# Patient Record
Sex: FEMALE | Race: WHITE
[De-identification: names, ages, dates, MRNs, and addresses within clinical notes are randomized per-mention and may not be internally consistent; named-entity substitution may affect disease eponyms.]

---

## 2020-09-29 ENCOUNTER — HOSPITAL ENCOUNTER (EMERGENCY)
Dept: HOSPITAL 63 - ER | Age: 35
Discharge: HOME | End: 2020-09-29
Payer: COMMERCIAL

## 2020-09-29 VITALS — DIASTOLIC BLOOD PRESSURE: 68 MMHG | SYSTOLIC BLOOD PRESSURE: 104 MMHG

## 2020-09-29 VITALS — WEIGHT: 187.61 LBS | HEIGHT: 69 IN | BODY MASS INDEX: 27.79 KG/M2
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
## 2020-09-29 DIAGNOSIS — F17.210: ICD-10-CM

## 2020-09-29 DIAGNOSIS — M79.645: Primary | ICD-10-CM

## 2020-09-29 DIAGNOSIS — K21.9: ICD-10-CM
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
## 2020-09-29 PROCEDURE — 99283 EMERGENCY DEPT VISIT LOW MDM: CPT

## 2020-09-29 PROCEDURE — 73130 X-RAY EXAM OF HAND: CPT

## 2020-09-29 NOTE — PHYS DOC
Past History


Past Medical History:  Anxiety, Depression, GERD


Past Surgical History:  Cholecystectomy, , Hysterectomy, Tonsillectomy


Additional Smoking Information:  


1-2/DAY


Alcohol Use:  None


Drug Use:  None





General Adult


EDM:


Chief Complaint:  HAND PROBLEM





HPI:


HPI:


34-year-old female past medical history significant for GERD and anxiety, 

presents the ED with complaints of localized left thumb pain over the MCP joint 

and metacarpal region for the past 2 months after patient had an accidental fall

with FOOSH injury.  Patient is left-hand dominant.  Was seen by her primary care

physician and referred to a hand specialist but appointment is in November.  

States pain is a dull nagging discomfort.  Has been using an immobilizer and 

over-the-counter analgesia but is very busy at home with 5 kids.  Denies any dec

reased range of motion, lack of sensation, severe pain or skin color changes. Pt

reports decreased left thumb  strength. No specific position worsens the 

pain. Not a  or avid skier.





Review of Systems:


Review of Systems:


Constitutional:  Denies fever or chills 


Eyes:  Denies change in visual acuity 


HENT:  Denies nasal congestion or sore throat 


Respiratory:  Denies cough or shortness of breath or hemoptysis


Cardiovascular:  Denies chest pain or edema 


GI:  Denies abdominal pain, nausea, vomiting,  or diarrhea 


: Denies dysuria 


Musculoskeletal:  Denies back pain or swelling or elbow/shoulder/other joint 

pain


Integument:  Denies rash 


Neurologic:  Denies headache, focal weakness or sensory changes, 


Endocrine:  Denies polyuria or polydipsia 


Lymphatic:  Denies swollen glands 


Psychiatric:  Denies depression or anxiety





Heart Score:


Risk Factors:


Risk Factors:  DM, Current or recent (<one month) smoker, HTN, HLP, family 

history of CAD, obesity.


Risk Scores:


Score 0 - 3:  2.5% MACE over next 6 weeks - Discharge Home


Score 4 - 6:  20.3% MACE over next 6 weeks - Admit for Clinical Observation


Score 7 - 10:  72.7% MACE over next 6 weeks - Early Invasive Strategies





Allergies:


Allergies:





Allergies








Coded Allergies Type Severity Reaction Last Updated Verified


 


  No Known Drug Allergies    3/24/17 No











Physical Exam:


PE:





Constitutional: Well developed, well nourished, no acute distress, non-toxic 

appearance. []


HENT: Normocephalic, atraumatic, 


Eyes:  EOMI, conjunctiva normal, no discharge. [] 


Neck: Normal range of motion, supple, 


Cardiovascular: S1, S2 present


Lungs & Thorax: Speaking in full sentences, bilateral equal chest rise


Abdomen:  soft, no tenderness, 


Skin: Warm, dry, no erythema, no rash. [] 


Back: No tenderness, 


Extremities:  no cyanosis, no clubbing, ROM intact, no edema, no joint swelling,

 localized ttp over mcp joint, positive finkelsteins test with pain over APL/EPB

 tendon


Neurologic: Alert and oriented X 3, normal motor function, normal sensory 

function, no focal deficits noted. []


Psychologic: Affect normal, judgement normal, mood normal. []





Current Patient Data:


Vital Signs:





                                   Vital Signs








  Date Time  Temp Pulse Resp B/P (MAP) Pulse Ox O2 Delivery O2 Flow Rate FiO2


 


20 13:20 97.7 75 20 104/68 (80) 98 Room Air  











EKG:


EKG:


[]





Radiology/Procedures:


Radiology/Procedures:


                                 IMAGING REPORT





                                     Signed





PATIENT: RUBEN JIMÉNEZ  ACCOUNT: [id number]     MRN#: [id number]


: 1985           LOCATION: ER              AGE: 34


SEX: F                    EXAM DT: 20         ACCESSION#: [id number]


STATUS: REG ER            ORD. PHYSICIAN: LOW LARSEN DO


REASON: left thumb pain


PROCEDURE: HAND LEFT 3V





PROCEDURE: HAND LEFT 3V


 


STUDY DATE: 2020


 


CLINICAL INDICATION / HISTORY: Reason: left thumb pain / Spl. 


Instructions:  / History: .


 


TECHNIQUE: PA, lateral and oblique views of the left hand.


 


COMPARISON: None


 


FINDINGS: No fracture or dislocation is identified. The bone density is 


normal. The joint spaces are maintained, and there are no erosions to 


suggest an inflammatory arthropathy. The soft tissues are unremarkable.


 


IMPRESSION: No acute osseous abnormality. 


 


Electronically signed by: Soco Pool MD (2020 2:00 PM) 


[id number]














DICTATED AND SIGNED BY:     SOCO POOL MD


DATE:     20 1400





CC: HUMBERTO CEBALLOS; LOW LARSEN DO ~





Course & Med Decision Making:


Course & Med Decision Making


Pertinent Labs and Imaging studies reviewed. (See chart for details)





Concern for left de quervains tenosynovitis, xray imaging with no luz erosion,

 fracture or gas. Pt with from of left thumb but c/o decreased  strength. 

Encouraged immolization of the thumb and will prescribe short term rx for 

meloxicam.  Will refer to hand surgery for definitive testing, pain management 

and steroid injections.  Strict ED return precautions given for decreased range 

of motion, rash, severe pain or neurologic deficits.  Encouraged urgent 

outpatient follow-up with PMD and Ortho/hand surgeon.  Life-threatening 

processes were considered but are low suspicion at this time, given history and 

physical exam. Pt was educated on all prescription medications and adverse 

effects.  All patient's questions were answered and pt was stable at time of 

discharge.





Differential includes fracture, dislocation, laceration, osteomyelitis, 

compartment syndrome, neurovascular injury or deficit, infection (abscess, 

cellulitis, septic arthritis), head/neck trauma, tendon or ligament injury.





I spoken with the patient and her caregivers.  I explained the patient's 

condition, diagnoses and treatment plan based on the information available to me

 at this time.  I have answered the patient and her caregiver's questions and 

addressed any concerns.  The patient and her caregivers have a good 

understanding of patient's diagnosis, condition and treatment plan as can be 

expected at this point.  Vital signs have been stable.  Patient's condition is 

stable and appropriate for discharge from the emergency department. 





Patient will pursue further outpatient evaluation with primary care physician or

 other designated or consulting physician as outlined in the discharge 

instructions.  The patient and/or caregivers are agreeable to this plan of care 

and follow-up instructions have been explained in detail.  The patient and/or c

aregivers have received these instructions in written form and have expressed an

 understanding of the discharge instructions.  The patient and/or caregivers are

 aware that any significant change of condition or worsening of symptoms should 

prompt immediate return to this or the closest emergency department or call to 

911.





Tree Disclaimer:


Dragon Disclaimer:


This electronic medical record was generated, in whole or in part, using a voice

 recognition dictation system.





Departure


Departure:


Impression:  


   Primary Impression:  


   Pain of left thumb


Disposition:  01 HOME/RESIDENCE PRIOR TO ADM


Condition:  STABLE


Referrals:  


HUMBERTO CEBALLOS (PCP)


Patient Instructions:  De Quervain's Tenosynovitis-Sports[facility], Thumb Sprain





Additional Instructions:  


[facility]


Appointments may be made with Bryce Alanis MD, Zaki Em MD, Jaciel Orr MD or JESUS Garibay MD, by calling [phone]





EMERGENCY DEPARTMENT GENERAL DISCHARGE INSTRUCTIONS





Thank you for coming to [facility] (ED) today and trusting us

 with you 


care.  We trust that you had a positivie experience in our Emergency Department.

  If you 


wish to speak to the department management, you may call the director at 

[phone].





YOUR FOLLOW UP INSTRUCTIONS ARE AS FOLLOWS:





1.  Do you have a private Doctor?  If you do not have a private doctor, please 

ask for a 


resource list of physicians or clinics that may be able to assist you with 

follow up care.





2.  The Emergency Physician has interpreted your x-rays.  The X-Ray specialist 

will also 


review them.  If there is a change in the findings, you will be notified in 48 

hours when at 


all possible.





3.  A lab test or culture has been done, your results will be reviewed and you 

will be 


notified if you need a change in treatment.





ADDITIONAL INSTRUCTIONS AND INFORMATION:





1.  Your care today has been supervised by a physician who is specially trained 

in emergency 


care.  Many problems require more than one evaluation for a complete diagnosis 

and 


treatment.  We recommend that you schedule your follow up appointment as 

recommended to 


ensure complete treatment of you illness or injury.  If you are unable to obtain

 follow up 


care and continue to have a problem, or if your condition worsens, we recommend 

that you 


return to the ED.





2.  We are not able to safely determine your condition over the phone nor are we

 able to 


give sound medical advice over the phone.  For these safety reasons, if you call

 for medical 


advice we will ask you to come to the ED for further evaluation.





3.  If you have any questions regarding these discharge instructions please call

 the ED at 


[phone].





SAFETY INFORMATION:





In the interest of safety, wellness, and injury prevention; we encourage you to 

wear your 


sealbelt, if you smoke; quite smoking, and we encourage family to use a 

protective helmet 


for bicycling and other sporting events that present an increased risk for head 

injury.





IF YOUR SYMPTOMS WORSEN OR NEW SYMPTOMS DEVELOP, OR YOU HAVE CONCERNS ABOUT YOUR

 CONDITION; 


OR IF YOUR CONDITION WORSENS WHILE YOU ARE WAITING FOR YOUR FOLLOW UP 

APPOINTMENT; EITHER 


CONTACT YOUR PRIMARY CARE DOCTOR, THE PHYSICIAN WHOSE NAME AND NUMBER YOU WERE 

GIVEN, OR 


RETURN TO THE ED IMMEDIATELY.


Scripts


Meloxicam (MELOXICAM) 7.5 Mg Tablet


1 TAB PO DAILY PRN for joint pain, #15 TAB 0 Refills


   Never take more than 2 tablets in one day


   Prov: LOW LARSEN DO         20





Justification of Admission:


Justification of Admission:


Justification of Admission Dx:  N/A











LOW LARSEN DO               Sep 29, 2020 14:19

## 2020-09-29 NOTE — RAD
PROCEDURE: HAND LEFT 3V

 

STUDY DATE: 9/29/2020

 

CLINICAL INDICATION / HISTORY: Reason: left thumb pain / Spl. 

Instructions:  / History: .

 

TECHNIQUE: PA, lateral and oblique views of the left hand.

 

COMPARISON: None

 

FINDINGS: No fracture or dislocation is identified. The bone density is 

normal. The joint spaces are maintained, and there are no erosions to 

suggest an inflammatory arthropathy. The soft tissues are unremarkable.

 

IMPRESSION: No acute osseous abnormality. 

 

Electronically signed by: Callum Pool MD (9/29/2020 2:00 PM) 

[id number]